# Patient Record
(demographics unavailable — no encounter records)

---

## 2025-06-04 NOTE — PHYSICAL EXAM
[TextEntry] : atrophic appearing labia and mucosa, very faint erythema on internal labia. no discrete lesions

## 2025-06-04 NOTE — DISCUSSION/SUMMARY
[FreeTextEntry1] : 72 yo here for r/o UTI very low suspicion for UTI, will sned ucx for SOLANGE> reviewed increased frequency of utis in postmenopausal women with atrophy.  ?yeast: rx diflucan x1, clotrimazole cream x2 weeks. if sx not improved advised pt to f/u with me for vaginal estrogen discussion for treatment of atrophy.  pt h/o breast cancer (and thyroid cancer) s/p b/lmastectomy and chemo, reviewed VERY low systemic absorption of vaginal estrogen , would be safe, pt nervous. will need full consult before starting. this was written down. pt aware.  22 minute consult

## 2025-06-04 NOTE — HISTORY OF PRESENT ILLNESS
[FreeTextEntry1] : 70 yo here for r/o uti  had UTI around [facility], so went to a walk in. had urine, dx with uti.was given nitrofurantoin, x7 days, sx resolved, also took azo.  still has some irritation, clotrimazole/betamethasone cream. helped a little. now has irritation on external genitalia, burning and itching. eating yogurt. last used cream 2 days ago.  no fevers or chills. no urinary frequency.